# Patient Record
(demographics unavailable — no encounter records)

---

## 2025-07-13 NOTE — DISCUSSION/SUMMARY
[FreeTextEntry1] : Multiple risk factors for atherosclerotic heart disease  Remy has risk factors for the development of atherosclerotic heart disease.  However there is no evidence of such to date.  Prior exercise stress test were reportedly normal.  The resting 7/25 electrocardiogram shows no evidence of myocardial ischemia or infarction.  A noninvasive ischemic evaluation would be helpful for management decisions. I have recommended the following: Risk factor modification Screening CT coronary calcium score Exercise treadmill ECG study Echocardiogram Further evaluation and treatment will be dictated by the results of the above studies and the patient's clinical course   Hyperlipidemia Hyperlipidemia represents a risk factor for atherosclerotic heart disease.  The target LDL level for primary prevention is less than 100.  The main clinical decision involves whether or not to institute antihyperlipidemic medical therapy.  Nonpharmacological therapy, specifically diet and exercise are emphasized as major aspects of treatment.  I have recommended the following: Low-salt low-fat low-cholesterol heart healthy diet.  Regular aerobic exercise Most recent lipid levels not available at this time are requested for review from Dr. Tong Target LDL level less than 100 as discussed above [employer] a reductase inhibitor therapy dependent on the above and the patient's clinical course  I have recommended the following: Screening CT coronary calcium score Exercise treadmill ECG study    Ventricular/atrial ectopy.  There is a long history of atrial and ventricular ectopy.  No arrhythmia has been documented.  The frequency of ventricular ectopy should be determined in an effort to determine requirements for intervention.  It is thought that if greater than 20% of all native beats represent ventricular premature depolarizations that this can be taxing and result in a cardiomyopathy.  I have recommended the following: Echocardiogram Zio patch/mobile telemetry study.     Hypertension The working diagnosis is labile essential hypertension.  The most recent guidelines by the American heart Association/American heart College of cardiology have lowered the threshold for medical intervention in the treatment of hypertension.  The main clinical decision involves whether or not to institute antihypertensive medical therapy.  Prior treatment with lisinopril had been discontinued  I have recommended the following Low-salt low-fat low-cholesterol heart healthy diet.  Regular aerobic exercise Home blood pressure monitoring Antihypertensive medical therapy dependent on the above and the patient's clinical course.   Heart murmur The working diagnosis is a systolic murmur secondary to aortic valve sclerosis.  The differential diagnosis would include aortic stenosis and mitral regurgitation.  The characteristics of the murmur are not suggestive of hemodynamically significant valvular pathology.  I have recommend the following: Echocardiogram    The diagnosis prognosis, risks, options and alternatives were reviewed at length with the patient.  All questions were answered.  Issues discussed included dyspnea hyperlipidemia atherosclerotic heart disease noninvasive cardiac testing hypertension diet and exercise. [EKG obtained to assist in diagnosis and management of assessed problem(s)] : EKG obtained to assist in diagnosis and management of assessed problem(s)

## 2025-07-13 NOTE — HISTORY OF PRESENT ILLNESS
[FreeTextEntry1] : 72  year old man cardiology consult requested because of dyspnea and elevated cholesterol  Remy has no known heart disease. He was advised to have  an echocardiogram and stress test in 11/20. However he never scheduled the studies. There is a previous history of a heart murmur and extrasystoles. He complains of occasional dyspnea and feelings as if he "needs to take a deep breath."  No exertional chest pain.  No syncope.  He reports elevation of the cholesterol levels and poor eating habits. There is no history of acute myocardial infarction or sudden death.  There is a previous history of hyperlipidemia.   has had elevated blood pressure levels on occasion during medical encounters.  Previously lisinopril 10 mg/day was advised but was not taken. Remy presents today for cardiovascular reevaluation

## 2025-07-13 NOTE — HISTORY OF PRESENT ILLNESS
[FreeTextEntry1] : 72  year old man cardiology consult requested because of dyspnea and elevated cholesterol  Remy has no known heart disease. He was advised to have  an echocardiogram and stress test in 11/20. However he never scheduled the studies. There is a previous history of a heart murmur and extrasystoles. He complains of occasional dyspnea and feelings as if he "needs to take a deep breath."  No exertional chest pain.  No syncope.  He reports elevation of the cholesterol levels and poor eating habits. There is no history of acute myocardial infarction or sudden death.  There is a previous history of hyperlipidemia.  Ronlad has had elevated blood pressure levels on occasion during medical encounters.  Previously lisinopril 10 mg/day was advised but was not taken. Remy presents today for cardiovascular reevaluation

## 2025-07-23 NOTE — REVIEW OF SYSTEMS
[Fever] : no fever [Chills] : no chills [Feeling Poorly] : not feeling poorly [Dry Eyes] : no dryness of the eyes [Sore Throat] : no sore throat [Chest Pain] : no chest pain [Palpitations] : no palpitations [Lower Ext Edema] : no extremity edema [Cough] : no cough [SOB on Exertion] : no shortness of breath during exertion [Abdominal Pain] : no abdominal pain [Dysuria] : no dysuria [Limb Pain] : no limb pain [Itching] : no itching [Dizziness] : no dizziness [Anxiety] : no anxiety [Muscle Weakness] : no muscle weakness [Swollen Glands] : no swollen glands

## 2025-07-23 NOTE — ASSESSMENT
[FreeTextEntry1] : 1.Microscopic hematuria: Pt with history of microscopic hematuria. He was following with urologist/ Dr. Deanne Schaffer (urologist) for his symptoms. Scr was stable at 0.9 on 4/16/25. No prior records available to review. Advised to drink plenty of fluids ~2-3L per day. Check CBC, renal panel, UA and UPCR and serum intact PTH level today. Advised patient to avoid NSAIDs, RCAs, and other nephrotoxins. Monitor renal function.  Follow up pending lab results.   45 minutes: spent in obtaining and reviewing previous records, performing the visit, counseling/coordination of care, creating orders, and documenting the encounter.

## 2025-07-23 NOTE — PHYSICAL EXAM
[General Appearance - Alert] : alert [General Appearance - In No Acute Distress] : in no acute distress [Sclera] : the sclera and conjunctiva were normal [Outer Ear] : the ears and nose were normal in appearance [Jugular Venous Distention Increased] : there was no jugular-venous distention [Auscultation Breath Sounds / Voice Sounds] : lungs were clear to auscultation bilaterally [Heart Sounds] : normal S1 and S2 [Heart Sounds Gallop] : no gallops [Edema] : there was no peripheral edema [Abdomen Soft] : soft [No CVA Tenderness] : no ~M costovertebral angle tenderness [Nail Clubbing] : no clubbing  or cyanosis of the fingernails [] : no rash [No Focal Deficits] : no focal deficits [Affect] : the affect was normal [Mood] : the mood was normal

## 2025-07-23 NOTE — HISTORY OF PRESENT ILLNESS
[FreeTextEntry1] : Pt is a 72-year-old male with history of BPH, microscopic hematuria and prostatitis who came to the clinic for the initial evaluation of hematuria.   Pt says he has history of intermittent prostatitis ~ 10 years. He has seen Dr. Deanne Schaffer (urologist) for his symptoms. He was also told that he has microscopic hematuria at that time. He self-referred for evaluation of microscopic hematuria. Pt denies history of HTN/DM, HLD. He denies any medications use/ OTC meds use. On review, Scr was stable at 0.9 on 4/16/25. No prior records available to review. Pt. Denies history of kidney disease or kidney stones in the past. No history of kidney disease in family. Denies recent use of NSAIDs/ motrin recently. Pt says recently he was told that he has high serum testosterone levels.   Pt. currently feels well. Pt. denies any chest pain, SOB, nausea, dysuria, weakness. He says he works out almost daily.